# Patient Record
Sex: FEMALE | Race: BLACK OR AFRICAN AMERICAN | Employment: FULL TIME | URBAN - METROPOLITAN AREA
[De-identification: names, ages, dates, MRNs, and addresses within clinical notes are randomized per-mention and may not be internally consistent; named-entity substitution may affect disease eponyms.]

---

## 2018-10-25 ENCOUNTER — HOSPITAL ENCOUNTER (EMERGENCY)
Age: 28
Discharge: HOME | End: 2018-10-25
Attending: EMERGENCY MEDICINE
Payer: SELF-PAY

## 2018-10-25 ENCOUNTER — APPOINTMENT (OUTPATIENT)
Dept: GENERAL RADIOLOGY | Age: 28
End: 2018-10-25
Attending: NURSE PRACTITIONER
Payer: SELF-PAY

## 2018-10-25 VITALS
BODY MASS INDEX: 33.15 KG/M2 | OXYGEN SATURATION: 100 % | RESPIRATION RATE: 16 BRPM | WEIGHT: 199 LBS | TEMPERATURE: 98.1 F | HEART RATE: 71 BPM | HEIGHT: 65 IN | DIASTOLIC BLOOD PRESSURE: 78 MMHG | SYSTOLIC BLOOD PRESSURE: 125 MMHG

## 2018-10-25 DIAGNOSIS — M25.562 ACUTE PAIN OF LEFT KNEE: Primary | ICD-10-CM

## 2018-10-25 PROCEDURE — 73564 X-RAY EXAM KNEE 4 OR MORE: CPT

## 2018-10-25 PROCEDURE — 99283 EMERGENCY DEPT VISIT LOW MDM: CPT

## 2018-10-25 PROCEDURE — 74011250637 HC RX REV CODE- 250/637: Performed by: NURSE PRACTITIONER

## 2018-10-25 RX ORDER — IBUPROFEN 400 MG/1
800 TABLET ORAL
Status: COMPLETED | OUTPATIENT
Start: 2018-10-25 | End: 2018-10-25

## 2018-10-25 RX ORDER — IBUPROFEN 800 MG/1
800 TABLET ORAL
Qty: 20 TAB | Refills: 0 | Status: SHIPPED | OUTPATIENT
Start: 2018-10-25 | End: 2018-11-01

## 2018-10-25 RX ADMIN — IBUPROFEN 800 MG: 400 TABLET ORAL at 11:23

## 2018-10-25 NOTE — ED PROVIDER NOTES
Je Moore is a 25year old femalee with a c/o left knee pain for the pat few days,. States she has heard grinding and clicking. denies trauma. Denies a chronic Hx of left knee pain. History reviewed. No pertinent past medical history. History reviewed. No pertinent surgical history. History reviewed. No pertinent family history. Social History Socioeconomic History  Marital status: SINGLE Spouse name: Not on file  Number of children: Not on file  Years of education: Not on file  Highest education level: Not on file Social Needs  Financial resource strain: Not on file  Food insecurity - worry: Not on file  Food insecurity - inability: Not on file  Transportation needs - medical: Not on file  Transportation needs - non-medical: Not on file Occupational History  Not on file Tobacco Use  Smoking status: Never Smoker  Smokeless tobacco: Never Used Substance and Sexual Activity  Alcohol use: Yes  Drug use: No  
 Sexual activity: Not on file Other Topics Concern  Not on file Social History Narrative  Not on file ALLERGIES: Patient has no known allergies. Review of Systems Musculoskeletal: Positive for arthralgias. All other systems reviewed and are negative. Vitals:  
 10/25/18 1010 BP: 119/77 Pulse: 68 Resp: 16 Temp: 98.1 °F (36.7 °C) SpO2: 100% Weight: 90.3 kg (199 lb) Height: 5' 5\" (1.651 m) Physical Exam  
Constitutional: She is oriented to person, place, and time. She appears well-developed and well-nourished. HENT:  
Head: Normocephalic and atraumatic. Eyes: EOM are normal. Pupils are equal, round, and reactive to light. Neck: Normal range of motion. Neck supple. Cardiovascular: Normal rate and regular rhythm. Pulmonary/Chest: Effort normal and breath sounds normal.  
Abdominal: Soft.  Bowel sounds are normal.  
Genitourinary:  
Genitourinary Comments: Ne 
  
 Musculoskeletal: Normal range of motion. She exhibits no edema, tenderness or deformity. Non TTP. Full ROM in flexion and extension. Anterior / Posterior Drawers - neg Varus / Valgus - neg Neurological: She is alert and oriented to person, place, and time. Skin: Skin is warm and dry. Nursing note and vitals reviewed. MDM Number of Diagnoses or Management Options Diagnosis management comments: PROGRESS NOTE:  Plain film XR reveiwd. No acute findings. Rohith Klein NP  1:04 PM 
 
 
 
  
Amount and/or Complexity of Data Reviewed Tests in the radiology section of CPT®: ordered and reviewed Independent visualization of images, tracings, or specimens: yes (Plain film XR 
) Risk of Complications, Morbidity, and/or Mortality Presenting problems: low Diagnostic procedures: low Management options: low Patient Progress Patient progress: stable Procedures Diagnosis: 1. Acute pain of left knee Disposition:   Discharged to Home. Follow-up Information Follow up With Specialties Details Why Contact [facility]  Call to arrange follow up   [address] [place] [address] [facility]) [phone] Medication List  
  
START taking these medications   
ibuprofen 800 mg tablet Commonly known as:  MOTRIN Take 1 Tab by mouth every eight (8) hours as needed for Pain for up to 7 days. Where to Get Your Medications Information about where to get these medications is not yet available Ask your nurse or doctor about these medications · ibuprofen 800 mg tablet

## 2018-10-25 NOTE — LETTER
[address] [facility] EMERGENCY DEPT 
[facility] [id number] [id number] 
[phone] Work/School Note Date: 10/25/2018 To Whom It May concern: 
 
Yaron Garrison was seen and treated today in the emergency room by the following provider(s): 
Attending Provider: Cintia Lopez MD 
Nurse Practitioner: Gianni Lozano NP. Yaron Garrison may return to work on 1026/18.  
 
Sincerely, 
 
 
 
 
Fito Nava